# Patient Record
Sex: MALE | Employment: STUDENT | URBAN - METROPOLITAN AREA
[De-identification: names, ages, dates, MRNs, and addresses within clinical notes are randomized per-mention and may not be internally consistent; named-entity substitution may affect disease eponyms.]

---

## 2024-08-19 ENCOUNTER — APPOINTMENT (OUTPATIENT)
Dept: RADIOLOGY | Facility: HOSPITAL | Age: 19
End: 2024-08-19
Payer: COMMERCIAL

## 2024-08-19 ENCOUNTER — HOSPITAL ENCOUNTER (EMERGENCY)
Facility: HOSPITAL | Age: 19
Discharge: HOME | End: 2024-08-19
Attending: EMERGENCY MEDICINE
Payer: COMMERCIAL

## 2024-08-19 VITALS
DIASTOLIC BLOOD PRESSURE: 76 MMHG | RESPIRATION RATE: 18 BRPM | HEIGHT: 69 IN | OXYGEN SATURATION: 99 % | HEART RATE: 66 BPM | SYSTOLIC BLOOD PRESSURE: 128 MMHG | TEMPERATURE: 96.5 F | BODY MASS INDEX: 26.36 KG/M2 | WEIGHT: 178 LBS

## 2024-08-19 DIAGNOSIS — S92.302A CLOSED DISPLACED FRACTURE OF METATARSAL BONE OF LEFT FOOT, UNSPECIFIED METATARSAL, INITIAL ENCOUNTER: Primary | ICD-10-CM

## 2024-08-19 PROCEDURE — 73630 X-RAY EXAM OF FOOT: CPT | Mod: LEFT SIDE | Performed by: RADIOLOGY

## 2024-08-19 PROCEDURE — 73630 X-RAY EXAM OF FOOT: CPT | Mod: LT

## 2024-08-19 PROCEDURE — 29515 APPLICATION SHORT LEG SPLINT: CPT | Mod: LT

## 2024-08-19 PROCEDURE — 2500000001 HC RX 250 WO HCPCS SELF ADMINISTERED DRUGS (ALT 637 FOR MEDICARE OP)

## 2024-08-19 PROCEDURE — 99283 EMERGENCY DEPT VISIT LOW MDM: CPT | Mod: 25

## 2024-08-19 RX ORDER — IBUPROFEN 600 MG/1
600 TABLET ORAL EVERY 6 HOURS PRN
Qty: 28 TABLET | Refills: 0 | Status: SHIPPED | OUTPATIENT
Start: 2024-08-19 | End: 2024-08-26

## 2024-08-19 RX ORDER — ACETAMINOPHEN 325 MG/1
975 TABLET ORAL ONCE
Status: COMPLETED | OUTPATIENT
Start: 2024-08-19 | End: 2024-08-19

## 2024-08-19 RX ORDER — IBUPROFEN 600 MG/1
600 TABLET ORAL ONCE
Status: COMPLETED | OUTPATIENT
Start: 2024-08-19 | End: 2024-08-19

## 2024-08-19 RX ORDER — OXYCODONE HYDROCHLORIDE 5 MG/1
5 TABLET ORAL ONCE
Status: COMPLETED | OUTPATIENT
Start: 2024-08-19 | End: 2024-08-19

## 2024-08-19 RX ORDER — OXYCODONE HYDROCHLORIDE 5 MG/1
5 TABLET ORAL EVERY 6 HOURS PRN
Qty: 12 TABLET | Refills: 0 | Status: SHIPPED | OUTPATIENT
Start: 2024-08-19 | End: 2024-08-22

## 2024-08-19 RX ORDER — ACETAMINOPHEN 500 MG
1000 TABLET ORAL EVERY 6 HOURS PRN
Qty: 30 TABLET | Refills: 0 | Status: SHIPPED | OUTPATIENT
Start: 2024-08-19 | End: 2024-08-29

## 2024-08-19 ASSESSMENT — COLUMBIA-SUICIDE SEVERITY RATING SCALE - C-SSRS
1. IN THE PAST MONTH, HAVE YOU WISHED YOU WERE DEAD OR WISHED YOU COULD GO TO SLEEP AND NOT WAKE UP?: NO
6. HAVE YOU EVER DONE ANYTHING, STARTED TO DO ANYTHING, OR PREPARED TO DO ANYTHING TO END YOUR LIFE?: NO
2. HAVE YOU ACTUALLY HAD ANY THOUGHTS OF KILLING YOURSELF?: NO

## 2024-08-19 ASSESSMENT — PAIN DESCRIPTION - LOCATION: LOCATION: FOOT

## 2024-08-19 ASSESSMENT — PAIN SCALES - GENERAL: PAINLEVEL_OUTOF10: 10 - WORST POSSIBLE PAIN

## 2024-08-19 ASSESSMENT — PAIN - FUNCTIONAL ASSESSMENT: PAIN_FUNCTIONAL_ASSESSMENT: 0-10

## 2024-08-19 ASSESSMENT — PAIN DESCRIPTION - ORIENTATION: ORIENTATION: LEFT

## 2024-08-19 NOTE — ED TRIAGE NOTES
Pt presents to the ED from work after a trailer fell on his foot. Pt's L foot is swollen and pt is moaning in pain. Pt states that someone dropped the trailer on his foot. Pt endorses some dizziness.

## 2024-08-19 NOTE — ED PROVIDER NOTES
HPI   Chief Complaint   Patient presents with    Foot Injury       Patient is a 19-year-old male with no past medical history presenting with concern for left foot pain after dropping a trailer on it approximately an hour ago.  Endorses some generalized dizziness associated with the pain.  Denies any medications at home.  Denies any allergies.            Patient History   No past medical history on file.  No past surgical history on file.  No family history on file.  Social History     Tobacco Use    Smoking status: Not on file    Smokeless tobacco: Not on file   Substance Use Topics    Alcohol use: Not on file    Drug use: Not on file       Physical Exam   ED Triage Vitals   Temperature Heart Rate Respirations BP   08/19/24 0939 08/19/24 0936 08/19/24 0936 08/19/24 0936   35.8 °C (96.5 °F) (!) 105 18 138/74      Pulse Ox Temp Source Heart Rate Source Patient Position   08/19/24 0936 08/19/24 0939 -- --   95 % Temporal        BP Location FiO2 (%)     -- --             Physical Exam  Constitutional:       Appearance: Normal appearance.   HENT:      Head: Normocephalic and atraumatic.   Eyes:      Extraocular Movements: Extraocular movements intact.   Cardiovascular:      Rate and Rhythm: Normal rate.      Comments: 2+ bilateral PT pulses.  Difficult to palpate left DP under swelling.  Pulmonary:      Effort: Pulmonary effort is normal.   Musculoskeletal:      Cervical back: Normal range of motion.      Comments: Significant swelling over dorsum of left foot centered over 3rd through 5th metatarsal.  Superficial abrasions to dorsal surface of foot but no evidence of penetrating injury.  Range of motion of ankle intact.  Able to wiggle toes with good strength.   Skin:     General: Skin is warm and dry.   Neurological:      General: No focal deficit present.      Mental Status: He is alert and oriented to person, place, and time.      Comments: Sensation to light touch intact diffusely over dorsum and plantar foot.    Psychiatric:         Mood and Affect: Mood normal.         Behavior: Behavior normal.           ED Course & MDM   Diagnoses as of 08/19/24 1353   Closed displaced fracture of metatarsal bone of left foot, unspecified metatarsal, initial encounter                 No data recorded     Custer Coma Scale Score: 15 (08/19/24 0938 : Renata Stephens, KIANA)                           Medical Decision Making  Patient is a 19-year-old male with no past medical history presenting with concern for left foot pain after having a trailer dropped on it approximately an hour ago.  Imaging notable for left 3rd through 5th metatarsal fractures with mild displacement of fourth and fifth metatarsal.  Closed, neurovascularly intact.  Case discussed with orthopedics given high concern for requiring operative fixation.  Recommended well-padded splint, follow-up in 1 week.  Abrasions cleaned and dressed and posterior short leg splint applied as described in procedure note.  Neurovascularly intact after splint application as well.  Pain controlled with acetaminophen, ibuprofen, oxycodone and prescribed these medications for home.  Patient is moving to [place] and provided referral and follow-up for  doctors but may need to establish with a different provider in [place].  Provided number to  orthopedics clinic to facilitate scheduling.  Return precautions and at home splint care discussed with patient and patient discharged home.    Patient seen and discussed with Dr. Marissa Gomez MD, PhD  Emergency Medicine PGY3          Procedure  Splint Application    Performed by: Nicholas Gomez MD  Authorized by: Tony Ann MD    Consent:     Consent obtained:  Verbal    Consent given by:  Patient    Risks, benefits, and alternatives were discussed: yes      Risks discussed:  Discoloration, numbness, pain and swelling  Universal protocol:     Procedure explained and questions answered to patient or proxy's satisfaction: yes       Relevant documents present and verified: yes      Test results available: yes      Imaging studies available: yes      Required blood products, implants, devices, and special equipment available: yes      Site/side marked: yes      Immediately prior to procedure a time out was called: yes      Patient identity confirmed:  Verbally with patient  Pre-procedure details:     Distal neurologic exam:  Normal (subjective pins like pain over fractures both before and after)    Distal perfusion: distal pulses strong    Procedure details:     Location:  Foot    Foot location:  L foot    Strapping: no      Cast type:  Short leg    Supplies:  Plaster    Attestation: Splint applied and adjusted personally by me    Post-procedure details:     Distal neurologic exam:  Unchanged    Distal perfusion: distal pulses strong      Procedure completion:  Tolerated    Post-procedure imaging: not applicable         Nicholas Gomez MD  Resident  08/19/24 4890

## 2024-08-19 NOTE — DISCHARGE INSTRUCTIONS
You will need close orthopedic follow-up.  I have put the number for Dr. Sol who I highly recommend if you are still in the [place] area in the next week.  Otherwise you will need to arrange for follow-up in the [place] area.  I did additionally put a [facility] orthopedic surgeon on your discharge paperwork.  Splint care instructions are attached.  Please keep the splint clean and dry until follow-up.  Showers can be done but place a waterproof bag over the splint prior to showering.  We would recommend not bearing weight on the cast and using crutches.    The number for scheduling orthopedic follow-up is below.    --   [facility]   [facility]  Phone: [phone]